# Patient Record
Sex: MALE | Race: WHITE | Employment: OTHER | URBAN - METROPOLITAN AREA
[De-identification: names, ages, dates, MRNs, and addresses within clinical notes are randomized per-mention and may not be internally consistent; named-entity substitution may affect disease eponyms.]

---

## 2017-10-31 ENCOUNTER — OFFICE VISIT (OUTPATIENT)
Dept: ENT CLINIC | Age: 67
End: 2017-10-31

## 2017-10-31 VITALS
HEIGHT: 70 IN | WEIGHT: 167 LBS | SYSTOLIC BLOOD PRESSURE: 116 MMHG | BODY MASS INDEX: 23.91 KG/M2 | DIASTOLIC BLOOD PRESSURE: 78 MMHG

## 2017-10-31 DIAGNOSIS — L98.9 LESION OF SKIN OF FACE: ICD-10-CM

## 2017-10-31 DIAGNOSIS — D48.5 NEOPLASM OF UNCERTAIN BEHAVIOR OF SKIN OF FACE: Primary | ICD-10-CM

## 2017-10-31 PROCEDURE — 11643 EXC F/E/E/N/L MAL+MRG 2.1-3: CPT | Performed by: OTOLARYNGOLOGY

## 2017-10-31 NOTE — PROGRESS NOTES
OPERATIVE NOTE    PREOPERATIVE DIAGNOSIS:  2.0 cm lesion right face     POSTOPERATIVE DIAGNOSIS:  Same      PROCEDURE:  Excise lesion of the right face 2.0 cm uncertain pathology    SURGEON:  Yamila Brewster    ANAESTHESIA:  Lidocaine 1% with epinephrine 1:100,000. FINDINGS:  2 cm lesion of right face cleaned with Betadine and injected with local anesthetic. Lesion excised in an elliptical fashion using a #15 blade scissors and forceps. Size of the lesion with margins was 2.1 cm. Skin bleeders cauterized with handheld electrocautery. Skin edges undermined. Closure with 5 sutures of 5-0 nylon. Dry sterile dressing.     FOLLOW UP:  One week for suture removal.

## 2017-11-07 ENCOUNTER — OFFICE VISIT (OUTPATIENT)
Dept: ENT CLINIC | Age: 67
End: 2017-11-07

## 2017-11-07 VITALS
WEIGHT: 167.4 LBS | HEART RATE: 80 BPM | HEIGHT: 70 IN | BODY MASS INDEX: 23.96 KG/M2 | SYSTOLIC BLOOD PRESSURE: 118 MMHG | DIASTOLIC BLOOD PRESSURE: 74 MMHG

## 2017-11-07 DIAGNOSIS — Z09 STATUS POST EXCISION OF SKIN LESION, FOLLOW-UP EXAM: Primary | ICD-10-CM

## 2017-11-07 PROCEDURE — 99024 POSTOP FOLLOW-UP VISIT: CPT | Performed by: OTOLARYNGOLOGY

## 2017-11-07 NOTE — PROGRESS NOTES
Lesion of right neck excised 1 week ago. Pathology: Basal cell carcinoma. Lateral margins are positive deep margin is not. All sutures removed incision is well-healed. Patient and I are both comfortable just watching the incision to see if there is any further tumor activity. Follow-up: As needed.

## 2018-10-03 ENCOUNTER — OFFICE VISIT (OUTPATIENT)
Dept: ENT CLINIC | Age: 68
End: 2018-10-03
Payer: COMMERCIAL

## 2018-10-03 VITALS
WEIGHT: 174.2 LBS | SYSTOLIC BLOOD PRESSURE: 129 MMHG | HEIGHT: 70 IN | BODY MASS INDEX: 24.94 KG/M2 | DIASTOLIC BLOOD PRESSURE: 76 MMHG | TEMPERATURE: 97.5 F | HEART RATE: 74 BPM

## 2018-10-03 DIAGNOSIS — C44.310 BASAL CELL CARCINOMA (BCC) OF SKIN OF FACE, UNSPECIFIED PART OF FACE: Primary | ICD-10-CM

## 2018-10-03 PROCEDURE — 99212 OFFICE O/P EST SF 10 MIN: CPT | Performed by: OTOLARYNGOLOGY

## 2018-10-03 PROCEDURE — G8484 FLU IMMUNIZE NO ADMIN: HCPCS | Performed by: OTOLARYNGOLOGY

## 2018-10-03 PROCEDURE — 1101F PT FALLS ASSESS-DOCD LE1/YR: CPT | Performed by: OTOLARYNGOLOGY

## 2018-10-03 PROCEDURE — G8427 DOCREV CUR MEDS BY ELIG CLIN: HCPCS | Performed by: OTOLARYNGOLOGY

## 2018-10-03 PROCEDURE — 1123F ACP DISCUSS/DSCN MKR DOCD: CPT | Performed by: OTOLARYNGOLOGY

## 2018-10-03 PROCEDURE — 3017F COLORECTAL CA SCREEN DOC REV: CPT | Performed by: OTOLARYNGOLOGY

## 2018-10-03 PROCEDURE — 4040F PNEUMOC VAC/ADMIN/RCVD: CPT | Performed by: OTOLARYNGOLOGY

## 2018-10-03 PROCEDURE — G8419 CALC BMI OUT NRM PARAM NOF/U: HCPCS | Performed by: OTOLARYNGOLOGY

## 2018-10-03 PROCEDURE — 1036F TOBACCO NON-USER: CPT | Performed by: OTOLARYNGOLOGY

## 2018-10-05 ENCOUNTER — PROCEDURE VISIT (OUTPATIENT)
Dept: ENT CLINIC | Age: 68
End: 2018-10-05
Payer: COMMERCIAL

## 2018-10-05 VITALS
HEART RATE: 84 BPM | WEIGHT: 172.8 LBS | TEMPERATURE: 97.6 F | DIASTOLIC BLOOD PRESSURE: 75 MMHG | SYSTOLIC BLOOD PRESSURE: 136 MMHG | HEIGHT: 70 IN | BODY MASS INDEX: 24.74 KG/M2

## 2018-10-05 DIAGNOSIS — C44.300 MALIGNANT NEOPLASM SKIN OF FACE: Primary | ICD-10-CM

## 2018-10-05 PROCEDURE — 11643 EXC F/E/E/N/L MAL+MRG 2.1-3: CPT | Performed by: OTOLARYNGOLOGY

## 2018-10-12 ENCOUNTER — OFFICE VISIT (OUTPATIENT)
Dept: ENT CLINIC | Age: 68
End: 2018-10-12

## 2018-10-12 DIAGNOSIS — Z09 STATUS POST EXCISION OF SKIN LESION, FOLLOW-UP EXAM: ICD-10-CM

## 2018-10-12 DIAGNOSIS — C44.310 BASAL CELL EPITHELIOMA, FACE: Primary | ICD-10-CM

## 2018-10-12 PROCEDURE — 99024 POSTOP FOLLOW-UP VISIT: CPT | Performed by: OTOLARYNGOLOGY

## 2018-10-19 ENCOUNTER — OFFICE VISIT (OUTPATIENT)
Dept: ENT CLINIC | Age: 68
End: 2018-10-19

## 2018-10-19 DIAGNOSIS — Z09 STATUS POST EXCISION OF SKIN LESION, FOLLOW-UP EXAM: Primary | ICD-10-CM

## 2018-10-19 PROCEDURE — 99024 POSTOP FOLLOW-UP VISIT: CPT | Performed by: OTOLARYNGOLOGY

## 2021-09-24 ENCOUNTER — TELEPHONE (OUTPATIENT)
Dept: CARDIOLOGY CLINIC | Age: 71
End: 2021-09-24

## 2021-09-24 NOTE — TELEPHONE ENCOUNTER
Patent called stating he was referred to Dr. Rigo Stockton by [facility] for abnormal EKG. Patient was informed that Dr. Rigo Stockton was not accepting new patients at this time and was offered an appointment with another provider but declined.

## 2021-10-01 ENCOUNTER — OFFICE VISIT (OUTPATIENT)
Dept: CARDIOLOGY CLINIC | Age: 71
End: 2021-10-01
Payer: MEDICARE

## 2021-10-01 VITALS
HEIGHT: 70 IN | DIASTOLIC BLOOD PRESSURE: 74 MMHG | SYSTOLIC BLOOD PRESSURE: 132 MMHG | WEIGHT: 155.4 LBS | BODY MASS INDEX: 22.25 KG/M2 | HEART RATE: 89 BPM

## 2021-10-01 DIAGNOSIS — I44.7 LBBB (LEFT BUNDLE BRANCH BLOCK): Primary | ICD-10-CM

## 2021-10-01 DIAGNOSIS — C85.91 LYMPHOMA OF LYMPH NODES OF HEAD, UNSPECIFIED LYMPHOMA TYPE (HCC): ICD-10-CM

## 2021-10-01 DIAGNOSIS — E78.00 HYPERCHOLESTEROLEMIA: ICD-10-CM

## 2021-10-01 DIAGNOSIS — R94.31 ABNORMAL EKG: ICD-10-CM

## 2021-10-01 PROCEDURE — 1123F ACP DISCUSS/DSCN MKR DOCD: CPT | Performed by: INTERNAL MEDICINE

## 2021-10-01 PROCEDURE — 93000 ELECTROCARDIOGRAM COMPLETE: CPT | Performed by: INTERNAL MEDICINE

## 2021-10-01 PROCEDURE — G8420 CALC BMI NORM PARAMETERS: HCPCS | Performed by: INTERNAL MEDICINE

## 2021-10-01 PROCEDURE — G8427 DOCREV CUR MEDS BY ELIG CLIN: HCPCS | Performed by: INTERNAL MEDICINE

## 2021-10-01 PROCEDURE — 4040F PNEUMOC VAC/ADMIN/RCVD: CPT | Performed by: INTERNAL MEDICINE

## 2021-10-01 PROCEDURE — 3017F COLORECTAL CA SCREEN DOC REV: CPT | Performed by: INTERNAL MEDICINE

## 2021-10-01 PROCEDURE — 99204 OFFICE O/P NEW MOD 45 MIN: CPT | Performed by: INTERNAL MEDICINE

## 2021-10-01 PROCEDURE — 4004F PT TOBACCO SCREEN RCVD TLK: CPT | Performed by: INTERNAL MEDICINE

## 2021-10-01 PROCEDURE — G8484 FLU IMMUNIZE NO ADMIN: HCPCS | Performed by: INTERNAL MEDICINE

## 2021-10-01 RX ORDER — VALACYCLOVIR HYDROCHLORIDE 1 G/1
1000 TABLET, FILM COATED ORAL DAILY
COMMUNITY
Start: 2020-12-16

## 2021-10-01 RX ORDER — ROSUVASTATIN CALCIUM 20 MG/1
TABLET, COATED ORAL
COMMUNITY
Start: 2021-09-23

## 2021-10-01 RX ORDER — FLUOROMETHOLONE 0.1 %
SUSPENSION, DROPS(FINAL DOSAGE FORM)(ML) OPHTHALMIC (EYE)
COMMUNITY
Start: 2021-09-01

## 2021-10-01 NOTE — PROGRESS NOTES
73 y.o. here for LBBB. Had an ecg bec he was going to have hernia surgery. Had surgery (did fine), and is now here for f/u. No prior cardiac problems. No cp. No sob. No n/v/LH/dizziness. No syncope. Does exercise. Walks, goes 5 miles several days per week. No issues. No LE edema. No orthopnea or PND. No prior MI/stroke. Retired in May of 2020. Normally BP in 130's. Past Medical History:   Diagnosis Date    Tinnitus    LBBB    Past Surgical History:   Procedure Laterality Date    APPENDECTOMY       Social History     Tobacco Use    Smoking status: Never Smoker    Smokeless tobacco: Never Used   Substance Use Topics    Alcohol use: Yes    Drug use: Not on file     No Known Allergies    Family History   Problem Relation Age of Onset    Cancer Mother     Cancer Father      Review of Systems   General: No fevers, chills, fatigue, or night sweats. No abnormal changes in weight. HEENT: No blurry or decreased vision. No changes in hearing, nasal discharge or sore throat. Cardiovascular: See HPI. No cramping in legs or buttocks when walking. Respiratory: No cough, hemoptysis, or wheezing. No history of asthma. Gastrointestinal: No abdominal pain, hematochezia, melana, or history of GI ulcers. Genito-Urinary: No dysuria or hematuria. No urgency or polyuria. Musculoskeletal: No complaints of joint pain, joint swelling or muscular weakness/soreness. Neurological: No dizziness or headaches. No numbness/tingling, speech problems or weakness. No history of a stroke or TIA. Psychological: No anxiety or depression  Hematological and Lymphatic: No abnormal bleeding or bruising, blood clots, jaundice. Endocrine: No malaise/lethargy, palpitations, polydipsia/polyuria, temperature intolerance or unexpected weight changes. Skin: No rashes or non-healing ulcers. PE:  Blood pressure 132/74, pulse 89, height 5' 10\" (1.778 m), weight 155 lb 6.4 oz (70.5 kg). General (appearance):   Well devel. 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 No JVD  Ears/Nose/Mouth/Thorat: No cyanosis  CV: RRR. No m/r/g   Respiratory:  Clear b, normal respiratory effort  GI: abd s/nt/nd  Skin: Warm, dry. No rashes  Neuro/Psych: Alert and oriented x 3. Appropriate behavior  Ext:  No c/c. No edema  Pulses:  2+ radial    No results found for: WBC, HGB, HCT, MCV, PLT  Lab Results   Component Value Date     09/21/2021    K 5.1 09/21/2021     09/21/2021    CO2 26 09/21/2021    BUN 14 09/21/2021    CREATININE 0.8 09/21/2021    GLUCOSE 100 (H) 09/21/2021    CALCIUM 9.7 09/21/2021    LABALBU 4.7 09/21/2021    LABGLOM >60 09/21/2021    GFRAA >60 09/21/2021     Lab Results   Component Value Date    CHOL 258 (H) 09/21/2021    CHOL 181 08/11/2020     Lab Results   Component Value Date    TRIG 66 09/21/2021    TRIG 78 08/11/2020     Lab Results   Component Value Date    HDL 97 (H) 09/21/2021    HDL 66 (H) 08/11/2020     Lab Results   Component Value Date    LDLCALC 148 (H) 09/21/2021    LDLCALC 99 08/11/2020     Lab Results   Component Value Date    LABVLDL 13 09/21/2021    LABVLDL 16 08/11/2020     No results found for: CHOLHDLRATIO    10/2021 ECG: NSR, LBBB    The 10-year ASCVD risk score ([address] et al., 2013) is: 15%    Values used to calculate the score:      Age: 73 years      Sex: Male      Is Non- : No      Diabetic: No      Tobacco smoker: No      Systolic Blood Pressure: 321 mmHg      Is BP treated: No      HDL Cholesterol: 97 mg/dL      Total Cholesterol: 258 mg/dL      Current Outpatient Medications:     rosuvastatin (CRESTOR) 20 MG tablet, , Disp: , Rfl:     fluorometholone (FML) 0.1 % ophthalmic suspension, , Disp: , Rfl:     valACYclovir (VALTREX) 1 g tablet, Take 1,000 mg by mouth daily, Disp: , Rfl:     A/P:  73 y.o. here for LBBB. Issues:  1. LBBB (left bundle branch block)    2. Abnormal EKG    3. Hypercholesterolemia    4.  Lymphoma of lymph nodes of head, unspecified lymphoma type ([facility] [address]      Recs:  -Cont crestor; just started it.  -Get echo  -He has no symptoms, active. If echo ok, will hold off on any further testing for now.

## 2021-10-05 ENCOUNTER — PROCEDURE VISIT (OUTPATIENT)
Dept: CARDIOLOGY CLINIC | Age: 71
End: 2021-10-05
Payer: MEDICARE

## 2021-10-05 DIAGNOSIS — I44.7 LBBB (LEFT BUNDLE BRANCH BLOCK): ICD-10-CM

## 2021-10-05 LAB
LV EF: 23 %
LVEF MODALITY: NORMAL

## 2021-10-05 PROCEDURE — 93306 TTE W/DOPPLER COMPLETE: CPT | Performed by: INTERNAL MEDICINE

## 2021-10-07 ENCOUNTER — PREP FOR PROCEDURE (OUTPATIENT)
Dept: CARDIOLOGY CLINIC | Age: 71
End: 2021-10-07

## 2021-10-07 DIAGNOSIS — I44.7 LBBB (LEFT BUNDLE BRANCH BLOCK): Primary | ICD-10-CM

## 2021-10-07 RX ORDER — SODIUM CHLORIDE 9 MG/ML
25 INJECTION, SOLUTION INTRAVENOUS PRN
Status: CANCELLED | OUTPATIENT
Start: 2021-10-07

## 2021-10-07 RX ORDER — SODIUM CHLORIDE 0.9 % (FLUSH) 0.9 %
5-40 SYRINGE (ML) INJECTION PRN
Status: CANCELLED | OUTPATIENT
Start: 2021-10-07

## 2021-10-07 RX ORDER — SODIUM CHLORIDE 9 MG/ML
INJECTION, SOLUTION INTRAVENOUS CONTINUOUS
Status: CANCELLED | OUTPATIENT
Start: 2021-10-07

## 2021-10-07 RX ORDER — CLOPIDOGREL BISULFATE 75 MG/1
75 TABLET ORAL DAILY
Qty: 30 TABLET | Refills: 0 | Status: SHIPPED
Start: 2021-10-07 | End: 2021-10-13 | Stop reason: HOSPADM

## 2021-10-07 RX ORDER — ASPIRIN 325 MG
325 TABLET ORAL ONCE
Status: CANCELLED | OUTPATIENT
Start: 2021-10-13

## 2021-10-07 RX ORDER — ASPIRIN 81 MG/1
81 TABLET ORAL DAILY
Qty: 30 TABLET | Refills: 0 | Status: SHIPPED | OUTPATIENT
Start: 2021-10-07

## 2021-10-07 RX ORDER — SODIUM CHLORIDE 0.9 % (FLUSH) 0.9 %
5-40 SYRINGE (ML) INJECTION EVERY 12 HOURS SCHEDULED
Status: CANCELLED | OUTPATIENT
Start: 2021-10-07

## 2021-10-07 NOTE — PROGRESS NOTES
Left Heart Catheterization    A left heart catheterization is a procedure that provides your cardiologist with detailed information regarding how your heart functions. A small catheter (long, fine tube) is inserted into an artery (a vessel that carries blood and oxygen) that leads to your heart. While watching with x-ray equipment, small amounts of dye are injected which enables visualization of the heart arteries and chambers. The pictures that your cardiologist receives from the cardiac catheterization enable him or her to decide on the best treatment for you. Date of the procedure: 10/13/21    Time of arrival: 0730    Cardiologist performing the procedure: _Kyree___________    Instructions for your left heart catheterization:    1. Bring a list of your medications to the hospital.    2.  Please notify us before the procedure if you are allergic to anything; especially x-ray contrast dye, iodine, nickel, or any type of jewelry. This is very important! 3. Do not eat or drink anything at all after midnight (or 8 hours) prior to the procedure. 4.  Take all morning medications EXCEPT any diuretics (water pills) the day of the procedure with a small sip of water. 5.  If you are on Coumadin, Warfarin, or Mumtaz Mary, please notify us so that we can make adjustments to your medication. 6.  If you are taking Xarelto, Eliquis, or Pradaxa, please stop staking these medications two days prior to the procedure (including the day of the procedure). 7.  If you are diabetic, check your blood sugar in the morning. If your blood sugar is 120 or less, do not take insulin. If your blood sugar is more than 120, take half the dose of your normal insulin. Do not take Metformin the night before your procedure or morning of the procedure. 8.  You MUST have someone to drive you home--no driving for 24 hours after your procedure.   If an intervention is performed, you might stay overnight in the hospital.    9.  Discharge instructions will be given to you at the time of your procedure. 10.  For any questions or if you cannot keep this appointment for any reason, please call [phone]. Spoke with pt regarding procedure. Pre-op instructions, time and location of arrival discussed. Pt will get COVID test 5-6 days prior to procedure. Pt verbalized understanding and all questions answered at this time.

## 2021-10-12 NOTE — PRE-PROCEDURE INSTRUCTIONS
Called patient about procedure. Told to be here at 0730 for procedure at 0900. Must be NPO after midnight but can take morning medication with sips of water. Patient stated they are not on blood thinners, only on ASA and plavix. Told to have a responsible adult with them to take them home and stay with them afterwards, if they do not get admitted to hospital. Also, to bring a current list of medications. No other questions or concerns.

## 2021-10-13 ENCOUNTER — HOSPITAL ENCOUNTER (OUTPATIENT)
Dept: CARDIAC CATH/INVASIVE PROCEDURES | Age: 71
Discharge: HOME | End: 2021-10-15
Payer: MEDICARE

## 2021-10-13 VITALS
HEIGHT: 70 IN | BODY MASS INDEX: 21.76 KG/M2 | DIASTOLIC BLOOD PRESSURE: 73 MMHG | OXYGEN SATURATION: 97 % | TEMPERATURE: 97.7 F | WEIGHT: 152 LBS | SYSTOLIC BLOOD PRESSURE: 137 MMHG | HEART RATE: 95 BPM

## 2021-10-13 LAB
A/G RATIO: 1.6 (ref 1.1–2.2)
ALBUMIN SERPL-MCNC: 4.6 G/DL (ref 3.4–5)
ALP BLD-CCNC: 63 U/L (ref 40–129)
ALT SERPL-CCNC: 33 U/L (ref 10–40)
ANION GAP SERPL CALCULATED.3IONS-SCNC: 11 MMOL/L (ref 3–16)
AST SERPL-CCNC: 20 U/L (ref 15–37)
BILIRUB SERPL-MCNC: 0.9 MG/DL (ref 0–1)
BUN BLDV-MCNC: 15 MG/DL (ref 7–20)
CALCIUM SERPL-MCNC: 8.9 MG/DL (ref 8.3–10.6)
CHLORIDE BLD-SCNC: 106 MMOL/L (ref 99–110)
CO2: 27 MMOL/L (ref 21–32)
CREAT SERPL-MCNC: 0.8 MG/DL (ref 0.8–1.3)
EKG ATRIAL RATE: 81 BPM
EKG DIAGNOSIS: NORMAL
EKG P AXIS: 56 DEGREES
EKG P-R INTERVAL: 148 MS
EKG Q-T INTERVAL: 426 MS
EKG QRS DURATION: 140 MS
EKG QTC CALCULATION (BAZETT): 494 MS
EKG R AXIS: 38 DEGREES
EKG T AXIS: 137 DEGREES
EKG VENTRICULAR RATE: 81 BPM
GFR AFRICAN AMERICAN: >60
GFR NON-AFRICAN AMERICAN: >60
GLOBULIN: 2.8 G/DL
GLUCOSE BLD-MCNC: 105 MG/DL (ref 70–99)
HCT VFR BLD CALC: 42.5 % (ref 40.5–52.5)
HEMOGLOBIN: 14.4 G/DL (ref 13.5–17.5)
INR BLD: 0.97 (ref 0.88–1.12)
LEFT VENTRICULAR EJECTION FRACTION MODE: NORMAL
LV EF: 25 %
MCH RBC QN AUTO: 30.4 PG (ref 26–34)
MCHC RBC AUTO-ENTMCNC: 33.8 G/DL (ref 31–36)
MCV RBC AUTO: 89.9 FL (ref 80–100)
PDW BLD-RTO: 13.7 % (ref 12.4–15.4)
PLATELET # BLD: 182 K/UL (ref 135–450)
PMV BLD AUTO: 9.3 FL (ref 5–10.5)
POTASSIUM SERPL-SCNC: 4.8 MMOL/L (ref 3.5–5.1)
PROTHROMBIN TIME: 11 SEC (ref 9.9–12.7)
RBC # BLD: 4.72 M/UL (ref 4.2–5.9)
SODIUM BLD-SCNC: 144 MMOL/L (ref 136–145)
TOTAL PROTEIN: 7.4 G/DL (ref 6.4–8.2)
WBC # BLD: 4.9 K/UL (ref 4–11)

## 2021-10-13 PROCEDURE — 85610 PROTHROMBIN TIME: CPT

## 2021-10-13 PROCEDURE — 99152 MOD SED SAME PHYS/QHP 5/>YRS: CPT

## 2021-10-13 PROCEDURE — 2500000003 HC RX 250 WO HCPCS

## 2021-10-13 PROCEDURE — 80053 COMPREHEN METABOLIC PANEL: CPT

## 2021-10-13 PROCEDURE — 6360000002 HC RX W HCPCS

## 2021-10-13 PROCEDURE — C1894 INTRO/SHEATH, NON-LASER: HCPCS

## 2021-10-13 PROCEDURE — 6360000004 HC RX CONTRAST MEDICATION: Performed by: INTERNAL MEDICINE

## 2021-10-13 PROCEDURE — 93458 L HRT ARTERY/VENTRICLE ANGIO: CPT

## 2021-10-13 PROCEDURE — 2709999900 HC NON-CHARGEABLE SUPPLY

## 2021-10-13 PROCEDURE — C1769 GUIDE WIRE: HCPCS

## 2021-10-13 PROCEDURE — 93005 ELECTROCARDIOGRAM TRACING: CPT | Performed by: INTERNAL MEDICINE

## 2021-10-13 PROCEDURE — 85027 COMPLETE CBC AUTOMATED: CPT

## 2021-10-13 PROCEDURE — 93010 ELECTROCARDIOGRAM REPORT: CPT | Performed by: INTERNAL MEDICINE

## 2021-10-13 PROCEDURE — 99152 MOD SED SAME PHYS/QHP 5/>YRS: CPT | Performed by: INTERNAL MEDICINE

## 2021-10-13 PROCEDURE — 93458 L HRT ARTERY/VENTRICLE ANGIO: CPT | Performed by: INTERNAL MEDICINE

## 2021-10-13 RX ORDER — SODIUM CHLORIDE 0.9 % (FLUSH) 0.9 %
5-40 SYRINGE (ML) INJECTION PRN
Status: DISCONTINUED | OUTPATIENT
Start: 2021-10-13 | End: 2021-10-16 | Stop reason: HOSPADM

## 2021-10-13 RX ORDER — SODIUM CHLORIDE 9 MG/ML
25 INJECTION, SOLUTION INTRAVENOUS PRN
Status: DISCONTINUED | OUTPATIENT
Start: 2021-10-13 | End: 2021-10-16 | Stop reason: HOSPADM

## 2021-10-13 RX ORDER — SODIUM CHLORIDE 0.9 % (FLUSH) 0.9 %
5-40 SYRINGE (ML) INJECTION EVERY 12 HOURS SCHEDULED
Status: DISCONTINUED | OUTPATIENT
Start: 2021-10-13 | End: 2021-10-16 | Stop reason: HOSPADM

## 2021-10-13 RX ORDER — SODIUM CHLORIDE 9 MG/ML
INJECTION, SOLUTION INTRAVENOUS CONTINUOUS
Status: ACTIVE | OUTPATIENT
Start: 2021-10-13 | End: 2021-10-13

## 2021-10-13 RX ORDER — SODIUM CHLORIDE 9 MG/ML
INJECTION, SOLUTION INTRAVENOUS CONTINUOUS
Status: DISCONTINUED | OUTPATIENT
Start: 2021-10-13 | End: 2021-10-16 | Stop reason: HOSPADM

## 2021-10-13 RX ORDER — ASPIRIN 325 MG
325 TABLET ORAL ONCE
Status: DISCONTINUED | OUTPATIENT
Start: 2021-10-13 | End: 2021-10-16 | Stop reason: HOSPADM

## 2021-10-13 RX ORDER — ACETAMINOPHEN 325 MG/1
650 TABLET ORAL EVERY 4 HOURS PRN
Status: DISCONTINUED | OUTPATIENT
Start: 2021-10-13 | End: 2021-10-16 | Stop reason: HOSPADM

## 2021-10-13 RX ORDER — LISINOPRIL 5 MG/1
5 TABLET ORAL DAILY
Qty: 90 TABLET | Refills: 3 | Status: SHIPPED | OUTPATIENT
Start: 2021-10-13 | End: 2021-12-01 | Stop reason: SDUPTHER

## 2021-10-13 RX ADMIN — IOHEXOL 150 ML: 350 INJECTION, SOLUTION INTRAVENOUS at 09:52

## 2021-10-13 NOTE — PLAN OF CARE
Brief Pre-Op Note/Sedation Assessment      Anastasio Spurling  1950  [id number]  9:28 AM    Planned Procedure: Cardiac Catheterization Procedure  Post Procedure Plan: Return to same level of care  Consent: I have discussed with the patient and/or the patient representative the indication, alternatives, and the possible risks and/or complications of the planned procedure and the anesthesia methods. The patient and/or patient representative appear to understand and agree to proceed. Chief Complaint:   Dyspnea on Exertion      Indications for Cath Procedure:  1. Presentation:  Suspected CAD and LV Dysfunction  2. Anginal Classification within 2 weeks:  No symptoms  3. Angina Symptoms Assessment:  Asymptomatic  4. Heart Failure Class within last 2 weeks:  No symptoms  5. Cardiovascular Instability:  No    Prior Ischemic Workup/Eval:  1. Pre-Procedural Medications: Yes: Aspirin and STATIN  2. Stress Test Completed? No    Does Patient need surgery?   Cath Valve Surgery:  No    Pre-Procedure Medical History:  Vital Signs:  /73   Pulse 95   Temp 97.7 °F (36.5 °C) (Oral)   Ht 5' 10\" (1.778 m)   Wt 152 lb (68.9 kg)   SpO2 97%   BMI 21.81 kg/m²     Allergies:  No Known Allergies  Medications:    Current Outpatient Medications   Medication Sig Dispense Refill    clopidogrel (PLAVIX) 75 MG tablet Take 1 tablet by mouth daily 30 tablet 0    aspirin EC 81 MG EC tablet Take 1 tablet by mouth daily 30 tablet 0    rosuvastatin (CRESTOR) 20 MG tablet       fluorometholone (FML) 0.1 % ophthalmic suspension       valACYclovir (VALTREX) 1 g tablet Take 1,000 mg by mouth daily       Current Facility-Administered Medications   Medication Dose Route Frequency Provider Last Rate Last Admin    0.9 % sodium chloride infusion   IntraVENous Continuous Maribel Churchill MD        0.9 % sodium chloride infusion  25 mL IntraVENous PRN Maribel Churchill MD        aspirin tablet 325 mg  325 mg Oral Once Shawna Austin Velázquez MD        sodium chloride flush 0.9 % injection 5-40 mL  5-40 mL IntraVENous 2 times per day Tracy Dasilva MD        sodium chloride flush 0.9 % injection 5-40 mL  5-40 mL IntraVENous PRN Tracy Dasilva MD           Past Medical History:    Past Medical History:   Diagnosis Date    Tinnitus        Surgical History:    Past Surgical History:   Procedure Laterality Date    APPENDECTOMY               Pre-Sedation:  Pre-Sedation Documentation and Exam:  I have personally completed a history, physical exam & review of systems for this patient (see notes). Prior History of Anesthesia Complications:   None    Modified Mallampati:  II (soft palate, uvula, fauces visible)    ASA Classification:  Class 3 - A patient with severe systemic disease that limits activity but is not incapacitating    Shanique Scale: Activity:  2 - Able to move 4 extremities voluntarily on command  Respiration:  2 - Able to breathe deeply and cough freely  Circulation:  2 - BP+/- 20mmHg of normal  Consciousness:  2 - Fully awake  Oxygen Saturation (color):  2 - Able to maintain oxygen saturation >92% on room air    Sedation/Anesthesia Plan:  Guard the patient's safety and welfare. Minimize physical discomfort and pain. Minimize negative psychological responses to treatment by providing sedation and analgesia and maximize the potential amnesia. Patient to meet pre-procedure discharge plan.     Medication Planned:  midazolam intravenously and fentanyl intravenously    Patient is an appropriate candidate for plan of sedation:   yes      Electronically signed by Tracy Dasilva MD on 10/13/2021 at 9:28 AM

## 2021-10-13 NOTE — PROCEDURES
Brenda Alexis  73 y.o.  [id number]    Referring MD:  Dr. Shelton Fontan    Indication:  LV dysfunction    Mallampati Class: 2    ASA Class: 3    After informed consent was obtained and history and exam reviewed, the patient was taken to the cardiac cath lab. The patient had both groins and the right wrist prepped and draped in sterile fashion. 1% Xylocaine was used to anesthetize the radial artery. A needle was inserted into the radial artery and a 5/6 Fr sheath was inserted. A solution of 2.5 mg verapamil, 200 mcg nitroglycerin, and 3,000U of heparin was administered via the sheath. Continuous pulse-oximetry and ECG monitoring was performed, and frequent blood pressure assessment was performed. An independent trained observer pushed medications at my direction. We monitored the patient's level of consciousness and vital signs/physiologic status throughout the procedure duration (see start and stop times below). A JR4 catheter was advanced through the sheath over a guide wire and advanced under fluoroscopic guidance into the ascending aorta. The wire was removed and the right coronary artery was engaged. Digital angiograms were recorded. The catheter was then removed and a JL 3.5 catheter was then advanced over the wire into the ascending aorta. The wire was removed, and the left main coronary artery was engaged. Digital angiograms were recorded. The catheter was then removed, and a pigtail catheter was advanced over the wire to the ascending aorta. The pigtail catheter was then placed into the left ventricle. Pressures were recorded. Ventriculography was performed. Post-ventriculography pressures and a pullback were performed. The pigtail catheter was then removed. All catheter exchanges done over a wire.     Hemostasis was obtained by TR Band    Complications: None    Estimated blood loss: < 50 mL    Sedation: 1 mg Versed, 25 mcg Fentanyl  Sedation start: 0939  Sedation stop: 1236    Angiographic Findings:  Right dominant system  Left Main:  Normal    Left Anterior Descending:  Mid 20% stenosis. Circumflex:  Non-dominant. No obstructive disease    Right Coronary:  Very large artery. Normal    Left Ventriculogram:  LVEF 20-25%    Hemodynamics (mm Hg):  Left Ventricular Pressure:  110/2, 4  Central Aortic Pressure:  117/67 (87)    Conclusions:  -No obstructive CAD  -Markedly depressed LVEF    Recommendations:  -BB and ACE  -Aldactone in the future  -Statin    Rocco Saleh MD, [facility] [place], [place]

## 2021-10-13 NOTE — H&P
73 y.o. here for cath. Has lbbb. Had an ecg bec he was going to have hernia surgery. Had surgery (did fine), and is now here for f/u. No prior cardiac problems. No cp. No sob. No n/v/LH/dizziness. No syncope. Does exercise. Walks, goes 5 miles several days per week. No issues. No LE edema. No orthopnea or PND. No prior MI/stroke. Retired in May of 2020. Normally BP in 130's. Past Medical History:   Diagnosis Date    Tinnitus    LBBB    Past Surgical History:   Procedure Laterality Date    APPENDECTOMY       Social History     Tobacco Use    Smoking status: Never Smoker    Smokeless tobacco: Never Used   Substance Use Topics    Alcohol use: Yes    Drug use: Not on file     No Known Allergies    Family History   Problem Relation Age of Onset    Cancer Mother     Cancer Father      Review of Systems   General: No fevers, chills, fatigue, or night sweats. No abnormal changes in weight. HEENT: No blurry or decreased vision. No changes in hearing, nasal discharge or sore throat. Cardiovascular: See HPI. No cramping in legs or buttocks when walking. Respiratory: No cough, hemoptysis, or wheezing. No history of asthma. Gastrointestinal: No abdominal pain, hematochezia, melana, or history of GI ulcers. Genito-Urinary: No dysuria or hematuria. No urgency or polyuria. Musculoskeletal: No complaints of joint pain, joint swelling or muscular weakness/soreness. Neurological: No dizziness or headaches. No numbness/tingling, speech problems or weakness. No history of a stroke or TIA. Psychological: No anxiety or depression  Hematological and Lymphatic: No abnormal bleeding or bruising, blood clots, jaundice. Endocrine: No malaise/lethargy, palpitations, polydipsia/polyuria, temperature intolerance or unexpected weight changes. Skin: No rashes or non-healing ulcers.     PE:  Blood pressure 137/73, pulse 95, temperature 97.7 °F (36.5 °C), temperature source Oral, height 5' 10\" (1.778 m), weight 152 lb (68.9 kg), SpO2 97 %. General (appearance): Well devel. 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 No JVD  Ears/Nose/Mouth/Thorat: No cyanosis  CV: RRR. No m/r/g   Respiratory:  Clear b, normal respiratory effort  GI: abd s/nt/nd  Skin: Warm, dry. No rashes  Neuro/Psych: Alert and oriented x 3. Appropriate behavior  Ext:  No c/c. No edema  Pulses:  2+ radial    Lab Results   Component Value Date    WBC 4.9 10/13/2021    HGB 14.4 10/13/2021    HCT 42.5 10/13/2021    MCV 89.9 10/13/2021     10/13/2021     Lab Results   Component Value Date     10/13/2021    K 4.8 10/13/2021     10/13/2021    CO2 27 10/13/2021    BUN 15 10/13/2021    CREATININE 0.8 10/13/2021    GLUCOSE 105 (H) 10/13/2021    CALCIUM 8.9 10/13/2021    PROT 7.4 10/13/2021    LABALBU 4.6 10/13/2021    BILITOT 0.9 10/13/2021    ALKPHOS 63 10/13/2021    AST 20 10/13/2021    ALT 33 10/13/2021    LABGLOM >60 10/13/2021    GFRAA >60 10/13/2021    AGRATIO 1.6 10/13/2021    GLOB 2.8 10/13/2021     Lab Results   Component Value Date    CHOL 258 (H) 09/21/2021    CHOL 181 08/11/2020     Lab Results   Component Value Date    TRIG 66 09/21/2021    TRIG 78 08/11/2020     Lab Results   Component Value Date    HDL 97 (H) 09/21/2021    HDL 66 (H) 08/11/2020     Lab Results   Component Value Date    LDLCALC 148 (H) 09/21/2021    LDLCALC 99 08/11/2020     Lab Results   Component Value Date    LABVLDL 13 09/21/2021    LABVLDL 16 08/11/2020     No results found for: CHOLHDLRATIO    10/2021 ECG: NSR, LBBB    10/5/2021 TTE: EF 20-25% with blobal dysfunction.     The 10-year ASCVD risk score (Mir Munoz, et al., 2013) is: 15.9%    Values used to calculate the score:      Age: 73 years      Sex: Male      Is Non- : No      Diabetic: No      Tobacco smoker: No      Systolic Blood Pressure: 755 mmHg      Is BP treated: No      HDL Cholesterol: 97 mg/dL      Total Cholesterol: 258 mg/dL      Current Outpatient Medications:     clopidogrel (PLAVIX) 75 MG tablet, Take 1 tablet by mouth daily, Disp: 30 tablet, Rfl: 0    aspirin EC 81 MG EC tablet, Take 1 tablet by mouth daily, Disp: 30 tablet, Rfl: 0    rosuvastatin (CRESTOR) 20 MG tablet, , Disp: , Rfl:     fluorometholone (FML) 0.1 % ophthalmic suspension, , Disp: , Rfl:     valACYclovir (VALTREX) 1 g tablet, Take 1,000 mg by mouth daily, Disp: , Rfl:     A/P:  73 y.o. here for LBBB.   Issues:  -LBBB  -Severe LV dysfunction    Recs:  -Cont crestor  -Aspirin; has been on plavix  -[facility] today

## 2021-10-20 ENCOUNTER — OFFICE VISIT (OUTPATIENT)
Dept: CARDIOLOGY CLINIC | Age: 71
End: 2021-10-20
Payer: MEDICARE

## 2021-10-20 VITALS
WEIGHT: 157.4 LBS | HEART RATE: 80 BPM | SYSTOLIC BLOOD PRESSURE: 110 MMHG | DIASTOLIC BLOOD PRESSURE: 60 MMHG | HEIGHT: 70 IN | BODY MASS INDEX: 22.54 KG/M2

## 2021-10-20 DIAGNOSIS — I50.20 HFREF (HEART FAILURE WITH REDUCED EJECTION FRACTION) (HCC): ICD-10-CM

## 2021-10-20 DIAGNOSIS — E78.00 HYPERCHOLESTEROLEMIA: ICD-10-CM

## 2021-10-20 DIAGNOSIS — I44.7 LBBB (LEFT BUNDLE BRANCH BLOCK): Primary | ICD-10-CM

## 2021-10-20 DIAGNOSIS — Z98.890 S/P CORONARY ANGIOGRAM: ICD-10-CM

## 2021-10-20 LAB
ANION GAP SERPL CALCULATED.3IONS-SCNC: 14 MMOL/L (ref 3–16)
BUN BLDV-MCNC: 20 MG/DL (ref 7–20)
CALCIUM SERPL-MCNC: 9.6 MG/DL (ref 8.3–10.6)
CHLORIDE BLD-SCNC: 101 MMOL/L (ref 99–110)
CO2: 23 MMOL/L (ref 21–32)
CREAT SERPL-MCNC: 0.9 MG/DL (ref 0.8–1.3)
GFR AFRICAN AMERICAN: >60
GFR NON-AFRICAN AMERICAN: >60
GLUCOSE BLD-MCNC: 92 MG/DL (ref 70–99)
POTASSIUM SERPL-SCNC: 4.7 MMOL/L (ref 3.5–5.1)
SODIUM BLD-SCNC: 138 MMOL/L (ref 136–145)

## 2021-10-20 PROCEDURE — 1036F TOBACCO NON-USER: CPT | Performed by: NURSE PRACTITIONER

## 2021-10-20 PROCEDURE — 3017F COLORECTAL CA SCREEN DOC REV: CPT | Performed by: NURSE PRACTITIONER

## 2021-10-20 PROCEDURE — 1123F ACP DISCUSS/DSCN MKR DOCD: CPT | Performed by: NURSE PRACTITIONER

## 2021-10-20 PROCEDURE — G8427 DOCREV CUR MEDS BY ELIG CLIN: HCPCS | Performed by: NURSE PRACTITIONER

## 2021-10-20 PROCEDURE — G8484 FLU IMMUNIZE NO ADMIN: HCPCS | Performed by: NURSE PRACTITIONER

## 2021-10-20 PROCEDURE — 4040F PNEUMOC VAC/ADMIN/RCVD: CPT | Performed by: NURSE PRACTITIONER

## 2021-10-20 PROCEDURE — 99214 OFFICE O/P EST MOD 30 MIN: CPT | Performed by: NURSE PRACTITIONER

## 2021-10-20 PROCEDURE — G8420 CALC BMI NORM PARAMETERS: HCPCS | Performed by: NURSE PRACTITIONER

## 2021-10-20 RX ORDER — METOPROLOL SUCCINATE 25 MG/1
25 TABLET, EXTENDED RELEASE ORAL DAILY
Qty: 90 TABLET | Refills: 3 | Status: SHIPPED | OUTPATIENT
Start: 2021-10-20 | End: 2021-11-10 | Stop reason: SDUPTHER

## 2021-10-20 NOTE — PROGRESS NOTES
CC CHF, s/p LHC     HPI:  73 y.o. patient of Dr Karla Figueroa with LBBB, LV dysfunction and HLD who had LHC which demonstrated no obstructive CAD and EF 25%. He denies cp, sob, LH/dizziness, palpitations or syncope. No LE edema, orthopnea of PND. No fever, chills, n/v/d or GI/ bleeding. Walks 5 miles a day. -130 before lisinopril and now  110s. Past Medical History:   Diagnosis Date    LBBB (left bundle branch block) 10/2021    LV dysfunction 10/2021    EF 25%. No CAD seen on LHC    Mixed hyperlipidemia     Tinnitus      Past Surgical History:   Procedure Laterality Date    APPENDECTOMY       Family History   Problem Relation Age of Onset    Cancer Mother     Cancer Father      Social History     Tobacco Use    Smoking status: Never Smoker    Smokeless tobacco: Never Used   Substance Use Topics    Alcohol use: Yes    Drug use: Not on file     Allergies:Patient has no known allergies. Review of Systems  General: No changes in weight, fatigue, or night sweats. HEENT: No blurry or decreased vision. No changes in hearing, nasal discharge or sore throat. Cardiovascular:  See HPI. Respiratory: No cough, hemoptysis, or wheezing. Gastrointestinal:  No abdominal pain, hematochezia, melana, constipation, diarrhea, or history of GI ulcers. Genito-Urinary: No dysuria or hematuria. No urgency or polyuria. Musculoskeletal:  No complaints of joint pain, joint swelling or muscular weakness/soreness. Neurological:  No dizziness, headaches, numbness/tingling, speech problems or weakness. Psychological:  No anxiety or depression. Hematological and Lymphatic: No abnormal bleeding or bruising, blood clots, jaundice or swollen lymph nodes. Endocrine:   No malaise/lethargy, palpitations, polydipsia/polyuria, temperature intolerance or unexpected weight changes  Skin:  No rashes or non-healing ulcers.     Physical Exam:  /60   Pulse 80   Ht 5' 10\" (1.778 m)   Wt 157 lb 6.4 oz (71.4 kg)   BMI 22.58 kg/m²    General (appearance):  No acute distress  Eyes: anicteric   Neck: soft, No JVD  Ears/Nose/Mouth/Thorat: No cyanosis  CV: RRR   Respiratory:  Clear, normal effort  GI: soft, non-tender, non-distended  Skin: Warm, dry. No rashes  Neuro/Psych: Alert and oriented x 3. Appropriate behavior  Ext:  No c/c. No edema  Pulses:  2+ right femoral. Right groin soft, no hematoma, good cap refill. Weight  Wt Readings from Last 3 Encounters:   10/13/21 152 lb (68.9 kg)   10/01/21 155 lb 6.4 oz (70.5 kg)   10/05/18 172 lb 12.8 oz (78.4 kg)          CBC:   Lab Results   Component Value Date    WBC 4.9 10/13/2021    HGB 14.4 10/13/2021    HCT 42.5 10/13/2021    MCV 89.9 10/13/2021     10/13/2021     BMP:  Lab Results   Component Value Date    CREATININE 0.8 10/13/2021    BUN 15 10/13/2021     10/13/2021    K 4.8 10/13/2021     10/13/2021    CO2 27 10/13/2021     Mag: No results found for: MG  LIVER PROFILE:   Lab Results   Component Value Date    ALT 33 10/13/2021    AST 20 10/13/2021    ALKPHOS 63 10/13/2021    BILITOT 0.9 10/13/2021     PT/INR:   Lab Results   Component Value Date    INR 0.97 10/13/2021    PROTIME 11.0 10/13/2021     Pro-BNP No results found for: PROBNP  LIPIDS:  No components found for: CHLPL  Lab Results   Component Value Date    TRIG 66 09/21/2021    TRIG 78 08/11/2020     Lab Results   Component Value Date    HDL 97 (H) 09/21/2021    HDL 66 (H) 08/11/2020     Lab Results   Component Value Date    LDLCALC 148 (H) 09/21/2021    LDLCALC 99 08/11/2020     Lab Results   Component Value Date    LABVLDL 13 09/21/2021    LABVLDL 16 08/11/2020     TSH:No results found for: TSH, Q1OIXIA, A2BBMIR, THYROIDAB    IMAGING:     10/13/2021 Coronary angiogram  Angiographic Findings:  Right dominant system  Left Main:  Normal  Left Anterior Descending:  Mid 20% stenosis. Circumflex:  Non-dominant. No obstructive disease  Right Coronary:  Very large artery.  Normal  Left Ventriculogram:  LVEF 20-25%  Hemodynamics (mm Hg):  Left Ventricular Pressure:  110/2, 4  Central Aortic Pressure:  117/67 (87)    10/5/2021 Echo:   Left ventricular cavity size is normal. There is mild concentric left   ventricular hypertrophy. Left ventricular function is reduced with ejection   fraction estimated at 20-25%. Global left ventricular hypokinesis is   present. Diastolic filling parameters suggest grade I diastolic dysfunction. Mitral annular calcification is present. Mild mitral regurgitation is   present. Mild aortic regurgitation is present. Mild tricuspid regurgitation. IVC size is dilated (>2.1 cm) but collapses > 50% with respiration   consistent with elevated RA pressure (8 mmHg). Estimated pulmonary artery   systolic pressure is at 31 mmHg assuming a right atrial pressure of 8 mmHg. Medications:   Current Outpatient Medications   Medication Sig Dispense Refill    lisinopril (PRINIVIL;ZESTRIL) 5 MG tablet Take 1 tablet by mouth daily 90 tablet 3    aspirin EC 81 MG EC tablet Take 1 tablet by mouth daily 30 tablet 0    rosuvastatin (CRESTOR) 20 MG tablet       fluorometholone (FML) 0.1 % ophthalmic suspension       valACYclovir (VALTREX) 1 g tablet Take 1,000 mg by mouth daily       No current facility-administered medications for this visit. Assessment:  1. LBBB (left bundle branch block)    2. Hypercholesterolemia    3. HFrEF (heart failure with reduced ejection fraction) ([address]    4. S/P coronary angiogram        Plan:  LBBB s/p Coronary angiogram; stable   No obstructive CAD on [facility]   ASA, crestor   ACE, BB  HFrEF EF 25%; acute issue   Appears compenstated   Lisinopril 5 mg started 10/13.  Check BMP   Start Toprol XL 25 mg po daily   Will add on MRA and SGLT2 if BP and kidneys can tolerates   Discussed s/sp decompensated HF  HLD; uncontrolled       Crestor started    Will need to recheck CMP/Lipids    Follow up with Dr Michelle Haro in 3 weeks     Reviewed most recent: CBC, BMP, LFT, Lipids, PT/INR, TSH  Reviewed most recent: ECG, Echo,  C

## 2021-10-21 ENCOUNTER — TELEPHONE (OUTPATIENT)
Dept: CARDIOLOGY CLINIC | Age: 71
End: 2021-10-21

## 2021-10-21 NOTE — TELEPHONE ENCOUNTER
Patient said his insurance denied his metoprolol that was prescribed by [facility] yesterday. I called Cristiano to see if it needed a PA. Fidelia Mar at pharmacy said it was denied it just cost $20 after insurance.        Called pt back and cleared it up

## 2021-11-10 ENCOUNTER — OFFICE VISIT (OUTPATIENT)
Dept: CARDIOLOGY CLINIC | Age: 71
End: 2021-11-10
Payer: MEDICARE

## 2021-11-10 VITALS
HEIGHT: 70 IN | SYSTOLIC BLOOD PRESSURE: 130 MMHG | OXYGEN SATURATION: 97 % | DIASTOLIC BLOOD PRESSURE: 70 MMHG | HEART RATE: 80 BPM | BODY MASS INDEX: 22.96 KG/M2 | WEIGHT: 160.4 LBS

## 2021-11-10 DIAGNOSIS — I44.7 LBBB (LEFT BUNDLE BRANCH BLOCK): ICD-10-CM

## 2021-11-10 DIAGNOSIS — I50.20 HFREF (HEART FAILURE WITH REDUCED EJECTION FRACTION) (HCC): Primary | ICD-10-CM

## 2021-11-10 DIAGNOSIS — E78.00 HYPERCHOLESTEROLEMIA: ICD-10-CM

## 2021-11-10 DIAGNOSIS — R94.31 ABNORMAL EKG: ICD-10-CM

## 2021-11-10 PROCEDURE — G8427 DOCREV CUR MEDS BY ELIG CLIN: HCPCS | Performed by: INTERNAL MEDICINE

## 2021-11-10 PROCEDURE — 1123F ACP DISCUSS/DSCN MKR DOCD: CPT | Performed by: INTERNAL MEDICINE

## 2021-11-10 PROCEDURE — 3017F COLORECTAL CA SCREEN DOC REV: CPT | Performed by: INTERNAL MEDICINE

## 2021-11-10 PROCEDURE — 1036F TOBACCO NON-USER: CPT | Performed by: INTERNAL MEDICINE

## 2021-11-10 PROCEDURE — 4040F PNEUMOC VAC/ADMIN/RCVD: CPT | Performed by: INTERNAL MEDICINE

## 2021-11-10 PROCEDURE — 99214 OFFICE O/P EST MOD 30 MIN: CPT | Performed by: INTERNAL MEDICINE

## 2021-11-10 PROCEDURE — G8484 FLU IMMUNIZE NO ADMIN: HCPCS | Performed by: INTERNAL MEDICINE

## 2021-11-10 PROCEDURE — G8420 CALC BMI NORM PARAMETERS: HCPCS | Performed by: INTERNAL MEDICINE

## 2021-11-10 RX ORDER — METOPROLOL SUCCINATE 50 MG/1
50 TABLET, EXTENDED RELEASE ORAL DAILY
Qty: 90 TABLET | Refills: 3 | Status: SHIPPED | OUTPATIENT
Start: 2021-11-10 | End: 2021-12-21 | Stop reason: SDUPTHER

## 2021-11-10 NOTE — PROGRESS NOTES
aliya. 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 No JVD  Ears/Nose/Mouth/Thorat: No cyanosis  CV: RRR. No m/r/g   Respiratory:  Clear b, normal respiratory effort  GI: abd s/nt/nd  Skin: Warm, dry. No rashes  Neuro/Psych: Alert and oriented x 3. Appropriate behavior  Ext:  No c/c. No edema  Pulses:  2+ radial    Lab Results   Component Value Date    WBC 4.9 10/13/2021    HGB 14.4 10/13/2021    HCT 42.5 10/13/2021    MCV 89.9 10/13/2021     10/13/2021     Lab Results   Component Value Date     10/20/2021    K 4.7 10/20/2021     10/20/2021    CO2 23 10/20/2021    BUN 20 10/20/2021    CREATININE 0.9 10/20/2021    GLUCOSE 92 10/20/2021    CALCIUM 9.6 10/20/2021    PROT 7.4 10/13/2021    LABALBU 4.6 10/13/2021    BILITOT 0.9 10/13/2021    ALKPHOS 63 10/13/2021    AST 20 10/13/2021    ALT 33 10/13/2021    LABGLOM >60 10/20/2021    GFRAA >60 10/20/2021    AGRATIO 1.6 10/13/2021    GLOB 2.8 10/13/2021     Lab Results   Component Value Date    CHOL 258 (H) 09/21/2021    CHOL 181 08/11/2020     Lab Results   Component Value Date    TRIG 66 09/21/2021    TRIG 78 08/11/2020     Lab Results   Component Value Date    HDL 97 (H) 09/21/2021    HDL 66 (H) 08/11/2020     Lab Results   Component Value Date    LDLCALC 148 (H) 09/21/2021    LDLCALC 99 08/11/2020     Lab Results   Component Value Date    LABVLDL 13 09/21/2021    LABVLDL 16 08/11/2020     No results found for: CHOLHDLRATIO    10/2021 LHC:  Angiographic Findings:  Right dominant system  Left Main:  Normal  Left Anterior Descending:  Mid 20% stenosis. Circumflex:  Non-dominant. No obstructive disease  Right Coronary:  Very large artery.  Normal  Left Ventriculogram:  LVEF 20-25%  Hemodynamics (mm Hg):  Left Ventricular Pressure:  110/2, 4  Central Aortic Pressure:  117/67 (87)    10/2021 ECG: NSR, LBBB    The 10-year ASCVD risk score (Mir Munoz, et al., 2013) is: 14.6%    Values used to calculate the score: Age: 73 years      Sex: Male      Is Non- : No      Diabetic: No      Tobacco smoker: No      Systolic Blood Pressure: 604 mmHg      Is BP treated: No      HDL Cholesterol: 97 mg/dL      Total Cholesterol: 258 mg/dL      Current Outpatient Medications:     metoprolol succinate (TOPROL XL) 25 MG extended release tablet, Take 1 tablet by mouth daily, Disp: 90 tablet, Rfl: 3    lisinopril (PRINIVIL;ZESTRIL) 5 MG tablet, Take 1 tablet by mouth daily, Disp: 90 tablet, Rfl: 3    aspirin EC 81 MG EC tablet, Take 1 tablet by mouth daily, Disp: 30 tablet, Rfl: 0    rosuvastatin (CRESTOR) 20 MG tablet, , Disp: , Rfl:     fluorometholone (FML) 0.1 % ophthalmic suspension, , Disp: , Rfl:     valACYclovir (VALTREX) 1 g tablet, Take 1,000 mg by mouth daily, Disp: , Rfl:     A/P:  73 y.o. here for LBBB. Issues:  1. HFrEF (heart failure with reduced ejection fraction) ([facility]    2. LBBB (left bundle branch block)    3. Abnormal EKG    4. Hypercholesterolemia      Recs:  -Cont crestor  -Increase toprol to 50  -Cont lisinopril 5  -See Corina in 2 weeks. Increase lisinopril to 10 mg daily then.  See Moraima Blanco 2 weeks after that and go up on toprol if HR is over 70 or lisinopril if HR < 70.  -See me in 3 mo

## 2021-12-01 ENCOUNTER — OFFICE VISIT (OUTPATIENT)
Dept: CARDIOLOGY CLINIC | Age: 71
End: 2021-12-01
Payer: MEDICARE

## 2021-12-01 ENCOUNTER — TELEPHONE (OUTPATIENT)
Dept: CARDIOLOGY CLINIC | Age: 71
End: 2021-12-01

## 2021-12-01 VITALS
HEIGHT: 70 IN | WEIGHT: 163 LBS | BODY MASS INDEX: 23.34 KG/M2 | SYSTOLIC BLOOD PRESSURE: 119 MMHG | DIASTOLIC BLOOD PRESSURE: 70 MMHG | HEART RATE: 65 BPM

## 2021-12-01 DIAGNOSIS — I44.7 LBBB (LEFT BUNDLE BRANCH BLOCK): ICD-10-CM

## 2021-12-01 DIAGNOSIS — E78.00 HYPERCHOLESTEROLEMIA: ICD-10-CM

## 2021-12-01 DIAGNOSIS — I50.20 HFREF (HEART FAILURE WITH REDUCED EJECTION FRACTION) (HCC): Primary | ICD-10-CM

## 2021-12-01 PROCEDURE — 99214 OFFICE O/P EST MOD 30 MIN: CPT | Performed by: NURSE PRACTITIONER

## 2021-12-01 PROCEDURE — 3017F COLORECTAL CA SCREEN DOC REV: CPT | Performed by: NURSE PRACTITIONER

## 2021-12-01 PROCEDURE — G8484 FLU IMMUNIZE NO ADMIN: HCPCS | Performed by: NURSE PRACTITIONER

## 2021-12-01 PROCEDURE — 1036F TOBACCO NON-USER: CPT | Performed by: NURSE PRACTITIONER

## 2021-12-01 PROCEDURE — 1123F ACP DISCUSS/DSCN MKR DOCD: CPT | Performed by: NURSE PRACTITIONER

## 2021-12-01 PROCEDURE — 4040F PNEUMOC VAC/ADMIN/RCVD: CPT | Performed by: NURSE PRACTITIONER

## 2021-12-01 PROCEDURE — G8420 CALC BMI NORM PARAMETERS: HCPCS | Performed by: NURSE PRACTITIONER

## 2021-12-01 PROCEDURE — G8427 DOCREV CUR MEDS BY ELIG CLIN: HCPCS | Performed by: NURSE PRACTITIONER

## 2021-12-01 RX ORDER — LISINOPRIL 10 MG/1
10 TABLET ORAL DAILY
Qty: 90 TABLET | Refills: 3 | Status: SHIPPED | OUTPATIENT
Start: 2021-12-01 | End: 2021-12-21 | Stop reason: SDUPTHER

## 2021-12-01 NOTE — PROGRESS NOTES
CC CHF, s/p LHC     HPI:  73 y.o. patient of Dr Genesis Johnson with LBBB, LV dysfunction and HLD who had LHC which demonstrated no obstructive CAD and EF 25%. We are triturating CHF medications. He is doing well. Denies cp, sob, LH/dizziness, palpitations or syncope. No LE edema, orthopnea, PND, abdominal bloating or weight gain. No fever, chills, n/v/d or GI/ bleeding. Continues to walk miles a day. Past Medical History:   Diagnosis Date    LBBB (left bundle branch block) 10/2021    LV dysfunction 10/2021    EF 25%. No CAD seen on [facility]    Mixed hyperlipidemia     Tinnitus      Past Surgical History:   Procedure Laterality Date    APPENDECTOMY       Family History   Problem Relation Age of Onset    Cancer Mother     Cancer Father      Social History     Tobacco Use    Smoking status: Never Smoker    Smokeless tobacco: Never Used   Substance Use Topics    Alcohol use: Yes    Drug use: Not on file     Allergies:Patient has no known allergies. Review of Systems  General: No changes in weight, fatigue, or night sweats. HEENT: No blurry or decreased vision. No changes in hearing, nasal discharge or sore throat. Cardiovascular:  See HPI. Respiratory: No cough, hemoptysis, or wheezing. Gastrointestinal:  No abdominal pain, hematochezia, melana, constipation, diarrhea, or history of GI ulcers. Genito-Urinary: No dysuria or hematuria. No urgency or polyuria. Musculoskeletal:  No complaints of joint pain, joint swelling or muscular weakness/soreness. Neurological:  No dizziness, headaches, numbness/tingling, speech problems or weakness. Psychological:  No anxiety or depression. Hematological and Lymphatic: No abnormal bleeding or bruising, blood clots, jaundice or swollen lymph nodes. Endocrine:   No malaise/lethargy, palpitations, polydipsia/polyuria, temperature intolerance or unexpected weight changes  Skin:  No rashes or non-healing ulcers.     Physical Exam:  /70   Pulse 65   Ht 5' 10\" (1.778 m)   Wt 163 lb (73.9 kg)   BMI 23.39 kg/m²    General (appearance):  No acute distress  Eyes: anicteric   Neck: soft, No JVD  Ears/Nose/Mouth/Thorat: No cyanosis  CV: RRR   Respiratory:  Clear, normal effort  GI: soft, non-tender, non-distended  Skin: Warm, dry. No rashes  Neuro/Psych: Alert and oriented x 3. Appropriate behavior  Ext:  No c/c. No edema  Pulses:  2+carotid     Weight  Wt Readings from Last 3 Encounters:   12/01/21 163 lb (73.9 kg)   11/10/21 160 lb 6.4 oz (72.8 kg)   10/20/21 157 lb 6.4 oz (71.4 kg)          CBC:   Lab Results   Component Value Date    WBC 4.9 10/13/2021    HGB 14.4 10/13/2021    HCT 42.5 10/13/2021    MCV 89.9 10/13/2021     10/13/2021     BMP:  Lab Results   Component Value Date    CREATININE 0.9 10/20/2021    BUN 20 10/20/2021     10/20/2021    K 4.7 10/20/2021     10/20/2021    CO2 23 10/20/2021     Mag: No results found for: MG  LIVER PROFILE:   Lab Results   Component Value Date    ALT 33 10/13/2021    AST 20 10/13/2021    ALKPHOS 63 10/13/2021    BILITOT 0.9 10/13/2021     PT/INR:   Lab Results   Component Value Date    INR 0.97 10/13/2021    PROTIME 11.0 10/13/2021     Pro-BNP No results found for: PROBNP  LIPIDS:  No components found for: CHLPL  Lab Results   Component Value Date    TRIG 66 09/21/2021    TRIG 78 08/11/2020     Lab Results   Component Value Date    HDL 97 (H) 09/21/2021    HDL 66 (H) 08/11/2020     Lab Results   Component Value Date    LDLCALC 148 (H) 09/21/2021    LDLCALC 99 08/11/2020     Lab Results   Component Value Date    LABVLDL 13 09/21/2021    LABVLDL 16 08/11/2020     TSH:No results found for: TSH, W8YPXGF, N2OHNOZ, THYROIDAB    IMAGING:     10/13/2021 Coronary angiogram  Angiographic Findings:  Right dominant system  Left Main:  Normal  Left Anterior Descending:  Mid 20% stenosis. Circumflex:  Non-dominant. No obstructive disease  Right Coronary:  Very large artery.  Normal  Left Ventriculogram:  LVEF 20-25%  Hemodynamics (mm Hg):  Left Ventricular Pressure:  110/2, 4  Central Aortic Pressure:  117/67 (87)    10/5/2021 Echo:   Left ventricular cavity size is normal. There is mild concentric left   ventricular hypertrophy. Left ventricular function is reduced with ejection   fraction estimated at 20-25%. Global left ventricular hypokinesis is   present. Diastolic filling parameters suggest grade I diastolic dysfunction. Mitral annular calcification is present. Mild mitral regurgitation is   present. Mild aortic regurgitation is present. Mild tricuspid regurgitation. IVC size is dilated (>2.1 cm) but collapses > 50% with respiration   consistent with elevated RA pressure (8 mmHg). Estimated pulmonary artery   systolic pressure is at 31 mmHg assuming a right atrial pressure of 8 mmHg. Medications:   Current Outpatient Medications   Medication Sig Dispense Refill    metoprolol succinate (TOPROL XL) 50 MG extended release tablet Take 1 tablet by mouth daily 90 tablet 3    lisinopril (PRINIVIL;ZESTRIL) 5 MG tablet Take 1 tablet by mouth daily 90 tablet 3    aspirin EC 81 MG EC tablet Take 1 tablet by mouth daily 30 tablet 0    rosuvastatin (CRESTOR) 20 MG tablet       fluorometholone (FML) 0.1 % ophthalmic suspension       valACYclovir (VALTREX) 1 g tablet Take 1,000 mg by mouth daily       No current facility-administered medications for this visit. Assessment:  1. HFrEF (heart failure with reduced ejection fraction) ([address]    2. LBBB (left bundle branch block)    3.  Hypercholesterolemia        Plan:  LBBB; stable   No obstructive CAD on [facility]   ASA, crestor   ACE, BB  HFrEF EF 25%; stable   Appears compensated   Does not require diuretics    Lisinopril increased to 10 mg po daily    Check BNP and BMP in 1 week    Cont Toprol 50 mg po daily   HLD; uncontrolled    Crestor 20 mg    Will need to recheck CMP/Lipids    Follow up with me in 2 weeks  Increase toprol to 100 mg daily if HR > 70 if not then increase lisinopril to 20 mg if BP can tolerate.       Reviewed most recent: CBC, BMP, LFT, Lipids, PT/INR,  Reviewed most recent: ECG, Echo,  LHC

## 2021-12-01 NOTE — PATIENT INSTRUCTIONS
Increase lisinopril to 10 mg  Daily  Check labs in 1 week (no need to fast)  Follow up in 2 week  Plan to continues to adjust metoprolol or lisinopril next visit.

## 2021-12-08 DIAGNOSIS — I50.20 HFREF (HEART FAILURE WITH REDUCED EJECTION FRACTION) (HCC): ICD-10-CM

## 2021-12-08 LAB
ANION GAP SERPL CALCULATED.3IONS-SCNC: 15 MMOL/L (ref 3–16)
BUN BLDV-MCNC: 16 MG/DL (ref 7–20)
CALCIUM SERPL-MCNC: 9.3 MG/DL (ref 8.3–10.6)
CHLORIDE BLD-SCNC: 103 MMOL/L (ref 99–110)
CO2: 24 MMOL/L (ref 21–32)
CREAT SERPL-MCNC: 0.9 MG/DL (ref 0.8–1.3)
GFR AFRICAN AMERICAN: >60
GFR NON-AFRICAN AMERICAN: >60
GLUCOSE BLD-MCNC: 98 MG/DL (ref 70–99)
POTASSIUM SERPL-SCNC: 4.8 MMOL/L (ref 3.5–5.1)
PRO-BNP: 712 PG/ML (ref 0–124)
SODIUM BLD-SCNC: 142 MMOL/L (ref 136–145)

## 2021-12-21 ENCOUNTER — OFFICE VISIT (OUTPATIENT)
Dept: CARDIOLOGY CLINIC | Age: 71
End: 2021-12-21
Payer: MEDICARE

## 2021-12-21 VITALS
BODY MASS INDEX: 23.16 KG/M2 | HEART RATE: 72 BPM | WEIGHT: 161.4 LBS | SYSTOLIC BLOOD PRESSURE: 128 MMHG | DIASTOLIC BLOOD PRESSURE: 70 MMHG

## 2021-12-21 DIAGNOSIS — I44.7 LBBB (LEFT BUNDLE BRANCH BLOCK): ICD-10-CM

## 2021-12-21 DIAGNOSIS — I50.20 HFREF (HEART FAILURE WITH REDUCED EJECTION FRACTION) (HCC): Primary | ICD-10-CM

## 2021-12-21 DIAGNOSIS — E78.00 HYPERCHOLESTEROLEMIA: ICD-10-CM

## 2021-12-21 PROCEDURE — G8427 DOCREV CUR MEDS BY ELIG CLIN: HCPCS | Performed by: NURSE PRACTITIONER

## 2021-12-21 PROCEDURE — G8420 CALC BMI NORM PARAMETERS: HCPCS | Performed by: NURSE PRACTITIONER

## 2021-12-21 PROCEDURE — 3017F COLORECTAL CA SCREEN DOC REV: CPT | Performed by: NURSE PRACTITIONER

## 2021-12-21 PROCEDURE — 99214 OFFICE O/P EST MOD 30 MIN: CPT | Performed by: NURSE PRACTITIONER

## 2021-12-21 PROCEDURE — 1036F TOBACCO NON-USER: CPT | Performed by: NURSE PRACTITIONER

## 2021-12-21 PROCEDURE — 4040F PNEUMOC VAC/ADMIN/RCVD: CPT | Performed by: NURSE PRACTITIONER

## 2021-12-21 PROCEDURE — G8484 FLU IMMUNIZE NO ADMIN: HCPCS | Performed by: NURSE PRACTITIONER

## 2021-12-21 PROCEDURE — 1123F ACP DISCUSS/DSCN MKR DOCD: CPT | Performed by: NURSE PRACTITIONER

## 2021-12-21 RX ORDER — LISINOPRIL 10 MG/1
10 TABLET ORAL DAILY
Qty: 90 TABLET | Refills: 3 | Status: SHIPPED | OUTPATIENT
Start: 2021-12-21 | End: 2022-02-02

## 2021-12-21 RX ORDER — METOPROLOL SUCCINATE 100 MG/1
100 TABLET, EXTENDED RELEASE ORAL DAILY
Qty: 90 TABLET | Refills: 3 | Status: SHIPPED | OUTPATIENT
Start: 2021-12-21

## 2021-12-21 NOTE — PROGRESS NOTES
CC CHF, s/p LHC     HPI:  73 y.o. patient of Dr Felice Santizo with LBBB, LV dysfunction and HLD who had LHC which demonstrated no obstructive CAD and EF 25%. We are triturating CHF medications. He c/o aches, pains, fatigue and chills since getting Covid booster 2 weeks ago. He has back and knee pain. He has sinus congestion. Symptoms are slowly improving. Unable to walk miles a day like before because of fatigue. Denies cp, sob, LH/dizziness, palpitations or syncope. No LE edema, orthopnea, PND, abdominal bloating or weight gain. No fever,  n/v/d or GI/ bleeding. Past Medical History:   Diagnosis Date    LBBB (left bundle branch block) 10/2021    LV dysfunction 10/2021    EF 25%. No CAD seen on [facility]    Mixed hyperlipidemia     Tinnitus      Past Surgical History:   Procedure Laterality Date    APPENDECTOMY       Family History   Problem Relation Age of Onset    Cancer Mother     Cancer Father      Social History     Tobacco Use    Smoking status: Never Smoker    Smokeless tobacco: Never Used   Substance Use Topics    Alcohol use: Yes    Drug use: Not on file     Allergies:Patient has no known allergies. Review of Systems  General: No changes in weight, fatigue, or night sweats. HEENT: No blurry or decreased vision. No changes in hearing, nasal discharge or sore throat. Cardiovascular:  See HPI. Respiratory: No cough, hemoptysis, or wheezing. Gastrointestinal:  No abdominal pain, hematochezia, melana, constipation, diarrhea, or history of GI ulcers. Genito-Urinary: No dysuria or hematuria. No urgency or polyuria. Musculoskeletal:  No complaints of joint pain, joint swelling or muscular weakness/soreness. Neurological:  No dizziness, headaches, numbness/tingling, speech problems or weakness. Psychological:  No anxiety or depression. Hematological and Lymphatic: No abnormal bleeding or bruising, blood clots, jaundice or swollen lymph nodes.   Endocrine:   No malaise/lethargy, palpitations, polydipsia/polyuria, temperature intolerance or unexpected weight changes  Skin:  No rashes or non-healing ulcers. Physical Exam:  /70   Pulse 72   Wt 161 lb 6.4 oz (73.2 kg)   BMI 23.16 kg/m²    General (appearance):  No acute distress  Eyes: anicteric   Neck: soft, No JVD  Ears/Nose/Mouth/Thorat: No cyanosis  CV: RRR   Respiratory:  Clear, normal effort  GI: soft, non-tender, non-distended  Skin: Warm, dry. No rashes  Neuro/Psych: Alert and oriented x 3. Appropriate behavior  Ext:  No c/c.  No edema  Pulses:  2+carotid     Weight  Wt Readings from Last 3 Encounters:   12/01/21 163 lb (73.9 kg)   11/10/21 160 lb 6.4 oz (72.8 kg)   10/20/21 157 lb 6.4 oz (71.4 kg)          CBC:   Lab Results   Component Value Date    WBC 4.9 10/13/2021    HGB 14.4 10/13/2021    HCT 42.5 10/13/2021    MCV 89.9 10/13/2021     10/13/2021     BMP:  Lab Results   Component Value Date    CREATININE 0.9 12/08/2021    BUN 16 12/08/2021     12/08/2021    K 4.8 12/08/2021     12/08/2021    CO2 24 12/08/2021     Mag: No results found for: MG  LIVER PROFILE:   Lab Results   Component Value Date    ALT 33 10/13/2021    AST 20 10/13/2021    ALKPHOS 63 10/13/2021    BILITOT 0.9 10/13/2021     PT/INR:   Lab Results   Component Value Date    INR 0.97 10/13/2021    PROTIME 11.0 10/13/2021     Pro-BNP   Lab Results   Component Value Date    PROBNP 712 12/08/2021     LIPIDS:  No components found for: CHLPL  Lab Results   Component Value Date    TRIG 66 09/21/2021    TRIG 78 08/11/2020     Lab Results   Component Value Date    HDL 97 (H) 09/21/2021    HDL 66 (H) 08/11/2020     Lab Results   Component Value Date    LDLCALC 148 (H) 09/21/2021    LDLCALC 99 08/11/2020     Lab Results   Component Value Date    LABVLDL 13 09/21/2021    LABVLDL 16 08/11/2020     TSH:No results found for: TSH, [id number], C8AJALY, THYROIDAB    IMAGING:     10/13/2021 Coronary angiogram  Angiographic Findings:  Right dominant system  Left Main: Normal  Left Anterior Descending:  Mid 20% stenosis. Circumflex:  Non-dominant. No obstructive disease  Right Coronary:  Very large artery. Normal  Left Ventriculogram:  LVEF 20-25%  Hemodynamics (mm Hg):  Left Ventricular Pressure:  110/2, 4  Central Aortic Pressure:  117/67 (87)    10/5/2021 Echo:   Left ventricular cavity size is normal. There is mild concentric left   ventricular hypertrophy. Left ventricular function is reduced with ejection   fraction estimated at 20-25%. Global left ventricular hypokinesis is   present. Diastolic filling parameters suggest grade I diastolic dysfunction. Mitral annular calcification is present. Mild mitral regurgitation is   present. Mild aortic regurgitation is present. Mild tricuspid regurgitation. IVC size is dilated (>2.1 cm) but collapses > 50% with respiration   consistent with elevated RA pressure (8 mmHg). Estimated pulmonary artery   systolic pressure is at 31 mmHg assuming a right atrial pressure of 8 mmHg. Medications:   Current Outpatient Medications   Medication Sig Dispense Refill    lisinopril (PRINIVIL;ZESTRIL) 10 MG tablet Take 1 tablet by mouth daily 90 tablet 3    metoprolol succinate (TOPROL XL) 50 MG extended release tablet Take 1 tablet by mouth daily 90 tablet 3    aspirin EC 81 MG EC tablet Take 1 tablet by mouth daily 30 tablet 0    rosuvastatin (CRESTOR) 20 MG tablet       fluorometholone (FML) 0.1 % ophthalmic suspension       valACYclovir (VALTREX) 1 g tablet Take 1,000 mg by mouth daily       No current facility-administered medications for this visit. Assessment:  1. HFrEF (heart failure with reduced ejection fraction) ([address]    2. LBBB (left bundle branch block)    3.  Hypercholesterolemia        Plan:  LBBB; stable   No obstructive CAD on [facility]   ASA, crestor   ACE, BB  HFrEF EF 25%; stable   Appears compensated   Does not require diuretics    Lisinopril 10 mg po daily    Toprol increased to 100 mg po daily   HLD; stable Crestor 20 mg    Will need to recheck CMP/Lipids    Follow up with me in 2 weeks  Increase lisinopril to 20 mg po daily next visit and check BMP    Reviewed most recent: CBC, BMP, LFT, Lipids, PT/INR,  Reviewed most recent: ECG, Echo,  LHC

## 2022-02-02 ENCOUNTER — OFFICE VISIT (OUTPATIENT)
Dept: CARDIOLOGY CLINIC | Age: 72
End: 2022-02-02
Payer: MEDICARE

## 2022-02-02 VITALS
HEART RATE: 72 BPM | BODY MASS INDEX: 23.31 KG/M2 | SYSTOLIC BLOOD PRESSURE: 132 MMHG | OXYGEN SATURATION: 96 % | HEIGHT: 70 IN | DIASTOLIC BLOOD PRESSURE: 78 MMHG | WEIGHT: 162.8 LBS

## 2022-02-02 DIAGNOSIS — I44.7 LBBB (LEFT BUNDLE BRANCH BLOCK): ICD-10-CM

## 2022-02-02 DIAGNOSIS — C85.91 LYMPHOMA OF LYMPH NODES OF HEAD, UNSPECIFIED LYMPHOMA TYPE (HCC): ICD-10-CM

## 2022-02-02 DIAGNOSIS — I51.9 LV DYSFUNCTION: Primary | ICD-10-CM

## 2022-02-02 DIAGNOSIS — I10 BENIGN ESSENTIAL HTN: ICD-10-CM

## 2022-02-02 DIAGNOSIS — E78.00 HYPERCHOLESTEROLEMIA: ICD-10-CM

## 2022-02-02 PROCEDURE — G8484 FLU IMMUNIZE NO ADMIN: HCPCS | Performed by: INTERNAL MEDICINE

## 2022-02-02 PROCEDURE — 4040F PNEUMOC VAC/ADMIN/RCVD: CPT | Performed by: INTERNAL MEDICINE

## 2022-02-02 PROCEDURE — 1123F ACP DISCUSS/DSCN MKR DOCD: CPT | Performed by: INTERNAL MEDICINE

## 2022-02-02 PROCEDURE — G8420 CALC BMI NORM PARAMETERS: HCPCS | Performed by: INTERNAL MEDICINE

## 2022-02-02 PROCEDURE — 3017F COLORECTAL CA SCREEN DOC REV: CPT | Performed by: INTERNAL MEDICINE

## 2022-02-02 PROCEDURE — G8427 DOCREV CUR MEDS BY ELIG CLIN: HCPCS | Performed by: INTERNAL MEDICINE

## 2022-02-02 PROCEDURE — 1036F TOBACCO NON-USER: CPT | Performed by: INTERNAL MEDICINE

## 2022-02-02 PROCEDURE — 99214 OFFICE O/P EST MOD 30 MIN: CPT | Performed by: INTERNAL MEDICINE

## 2022-02-02 RX ORDER — LISINOPRIL 20 MG/1
20 TABLET ORAL DAILY
Qty: 90 TABLET | Refills: 3 | Status: SHIPPED | OUTPATIENT
Start: 2022-02-02

## 2022-02-02 NOTE — PATIENT INSTRUCTIONS
-Increase lisinopril to 20 mg daily  -Get bloodwork in 7-10 days  -See Dr. Gregory Daly in 4-6 months

## 2022-02-02 NOTE — PROGRESS NOTES
73 y.o. here for LBBB. Has NICM with cath in 10/2021 with no cad; ef ~25%. 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 No cp. No sob. No n/v/LH/dizziness. No syncope. Got booster shot and had symptoms/reaction for weeks. Feeling ok now. Not doing a lot outside but uses ellyptical and stretches in basement. Tolerating meds. Past Medical History:   Diagnosis Date    LBBB (left bundle branch block) 10/2021    LV dysfunction 10/2021    EF 25%. No CAD [facility] on [facility]    Mixed hyperlipidemia     Tinnitus    LBBB    Past Surgical History:   Procedure Laterality Date    APPENDECTOMY       Social History     Tobacco Use    Smoking status: Never Smoker    Smokeless tobacco: Never Used   Substance Use Topics    Alcohol use: Yes    Drug use: Not on file     No Known Allergies    Family History   Problem Relation Age of Onset    Cancer Mother     Cancer Father      Review of Systems   General: No fevers, chills, fatigue, or night sweats. No abnormal changes in weight. HEENT: No blurry or decreased vision. No changes in hearing, nasal discharge or sore throat. Cardiovascular: See HPI. No cramping in legs or buttocks when walking. Respiratory: No cough, hemoptysis, or wheezing. No history of asthma. Gastrointestinal: No abdominal pain, hematochezia, melana, or history of GI ulcers. Genito-Urinary: No dysuria or hematuria. No urgency or polyuria. Musculoskeletal: No complaints of joint pain, joint swelling or muscular weakness/soreness. Neurological: No dizziness or headaches. No numbness/tingling, speech problems or weakness. No history of a stroke or TIA. Psychological: No anxiety or depression  Hematological and Lymphatic: No abnormal bleeding or bruising, blood clots, jaundice. Endocrine: No malaise/lethargy, palpitations, polydipsia/polyuria, temperature intolerance or unexpected weight changes. Skin: No rashes or non-healing ulcers.     PE:  Blood pressure 132/78, pulse 72, height 5' 10\" (1.778 m), weight 162 lb 12.8 oz (73.8 kg), SpO2 96 %. General (appearance): Well devel. 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 No JVD  Ears/Nose/Mouth/Thorat: No cyanosis  CV: RRR. No m/r/g   Respiratory:  Clear b, normal respiratory effort  GI: abd s/nt/nd  Skin: Warm, dry. No rashes  Neuro/Psych: Alert and oriented x 3. Appropriate behavior  Ext:  No c/c. No edema  Pulses:  2+ radial    Lab Results   Component Value Date    WBC 4.9 10/13/2021    HGB 14.4 10/13/2021    HCT 42.5 10/13/2021    MCV 89.9 10/13/2021     10/13/2021     Lab Results   Component Value Date     12/08/2021    K 4.8 12/08/2021     12/08/2021    CO2 24 12/08/2021    BUN 16 12/08/2021    CREATININE 0.9 12/08/2021    GLUCOSE 98 12/08/2021    CALCIUM 9.3 12/08/2021    PROT 7.4 10/13/2021    LABALBU 4.6 10/13/2021    BILITOT 0.9 10/13/2021    ALKPHOS 63 10/13/2021    AST 20 10/13/2021    ALT 33 10/13/2021    LABGLOM >60 12/08/2021    GFRAA >60 12/08/2021    AGRATIO 1.6 10/13/2021    GLOB 2.8 10/13/2021     Lab Results   Component Value Date    CHOL 258 (H) 09/21/2021    CHOL 181 08/11/2020     Lab Results   Component Value Date    TRIG 66 09/21/2021    TRIG 78 08/11/2020     Lab Results   Component Value Date    HDL 97 (H) 09/21/2021    HDL 66 (H) 08/11/2020     Lab Results   Component Value Date    LDLCALC 148 (H) 09/21/2021    LDLCALC 99 08/11/2020     Lab Results   Component Value Date    LABVLDL 13 09/21/2021    LABVLDL 16 08/11/2020     No results found for: CHOLHDLRATIO    10/2021 LHC:  Angiographic Findings:  Right dominant system  Left Main:  Normal  Left Anterior Descending:  Mid 20% stenosis. Circumflex:  Non-dominant. No obstructive disease  Right Coronary:  Very large artery.  Normal  Left Ventriculogram:  LVEF 20-25%  Hemodynamics (mm Hg):  Left Ventricular Pressure:  110/2, 4  Central Aortic Pressure:  117/67 (87)    10/2021 ECG: NSR, LBBB    The 10-year ASCVD risk score (Nilsa Bloom et al., 2013) is: 15%    Values used to calculate the score:      Age: 73 years      Sex: Male      Is Non- : No      Diabetic: No      Tobacco smoker: No      Systolic Blood Pressure: 495 mmHg      Is BP treated: No      HDL Cholesterol: 97 mg/dL      Total Cholesterol: 258 mg/dL      Current Outpatient Medications:     metoprolol succinate (TOPROL XL) 100 MG extended release tablet, Take 1 tablet by mouth daily, Disp: 90 tablet, Rfl: 3    lisinopril (PRINIVIL;ZESTRIL) 10 MG tablet, Take 1 tablet by mouth daily, Disp: 90 tablet, Rfl: 3    aspirin EC 81 MG EC tablet, Take 1 tablet by mouth daily, Disp: 30 tablet, Rfl: 0    rosuvastatin (CRESTOR) 20 MG tablet, , Disp: , Rfl:     fluorometholone (FML) 0.1 % ophthalmic suspension, , Disp: , Rfl:     valACYclovir (VALTREX) 1 g tablet, Take 1,000 mg by mouth daily, Disp: , Rfl:     A/P:  73 y.o. here for LBBB, LV dysfunction  Issues:  1. LV dysfunction    2. LBBB (left bundle branch block)    3. Hypercholesterolemia    4. Lymphoma of lymph nodes of head, unspecified lymphoma type ([facility] [address]    5. Benign essential HTN        Recs:  -Cont crestor  -Toprol 100  -Lisinopril to 20 and get BMP in 1-2 weeks    Maricruz Solorzano MD, [facility]

## 2022-02-14 LAB
ANION GAP SERPL CALCULATED.3IONS-SCNC: 12 MMOL/L (ref 3–16)
BUN BLDV-MCNC: 18 MG/DL (ref 7–20)
CALCIUM SERPL-MCNC: 8.9 MG/DL (ref 8.3–10.6)
CHLORIDE BLD-SCNC: 105 MMOL/L (ref 99–110)
CO2: 25 MMOL/L (ref 21–32)
CREAT SERPL-MCNC: 0.8 MG/DL (ref 0.8–1.3)
GFR AFRICAN AMERICAN: >60
GFR NON-AFRICAN AMERICAN: >60
GLUCOSE BLD-MCNC: 91 MG/DL (ref 70–99)
POTASSIUM SERPL-SCNC: 4.4 MMOL/L (ref 3.5–5.1)
SODIUM BLD-SCNC: 142 MMOL/L (ref 136–145)

## 2022-08-23 NOTE — TELEPHONE ENCOUNTER
Pt states that Dr. Amie Tovar prescribed Coplidogrel and was not sure if he should continue taking it. I informed pt that it was discontinued by Dr. Amie Tovar and in his instruction it states please stop taking at discharge. He can be reached at  [phone] for any further question.
If you are a smoker, it is important for your health to stop smoking. Please be aware that second hand smoke is also harmful.

## 2023-05-08 ENCOUNTER — TELEPHONE (OUTPATIENT)
Dept: DERMATOLOGY | Age: 73
End: 2023-05-08

## 2023-05-08 NOTE — TELEPHONE ENCOUNTER
Pt calling wanting  to see if  will see him as a NP she sees his wife I informed him she was not accepting NP pls return call back @ [phone] to discuss

## 2023-05-09 NOTE — TELEPHONE ENCOUNTER
Patient's wife notified not accepting new patients at this time and sending Mychart message with dermatology practices that may be accepting new patients.

## 2025-07-10 ENCOUNTER — TELEPHONE (OUTPATIENT)
Age: 75
End: 2025-07-10

## 2025-07-10 NOTE — TELEPHONE ENCOUNTER
Pt called he would be a new pt. His wife(Evette) is a pt of yours. He would like a skin exam.    [phone]

## 2025-07-16 ENCOUNTER — TELEPHONE (OUTPATIENT)
Age: 75
End: 2025-07-16

## 2025-07-16 NOTE — TELEPHONE ENCOUNTER
His wife(Zenobia Mathew ) is a pt of yours. He would like to be seen for a skin exam as new pt.    [phone]

## 2025-08-20 ENCOUNTER — OFFICE VISIT (OUTPATIENT)
Dept: DERMATOLOGY | Age: 75
End: 2025-08-20
Payer: MEDICARE

## 2025-08-20 DIAGNOSIS — L57.0 ACTINIC KERATOSES: ICD-10-CM

## 2025-08-20 DIAGNOSIS — C44.90 NON-MELANOMA SKIN CANCER: Primary | ICD-10-CM

## 2025-08-20 DIAGNOSIS — D18.01 CHERRY ANGIOMA: ICD-10-CM

## 2025-08-20 DIAGNOSIS — L21.9 SEBORRHEIC DERMATITIS: ICD-10-CM

## 2025-08-20 DIAGNOSIS — L82.1 SEBORRHEIC KERATOSES: ICD-10-CM

## 2025-08-20 DIAGNOSIS — D22.9 MULTIPLE BENIGN NEVI: ICD-10-CM

## 2025-08-20 PROCEDURE — 17000 DESTRUCT PREMALG LESION: CPT | Performed by: STUDENT IN AN ORGANIZED HEALTH CARE EDUCATION/TRAINING PROGRAM

## 2025-08-20 PROCEDURE — 1123F ACP DISCUSS/DSCN MKR DOCD: CPT | Performed by: STUDENT IN AN ORGANIZED HEALTH CARE EDUCATION/TRAINING PROGRAM

## 2025-08-20 PROCEDURE — 1036F TOBACCO NON-USER: CPT | Performed by: STUDENT IN AN ORGANIZED HEALTH CARE EDUCATION/TRAINING PROGRAM

## 2025-08-20 PROCEDURE — 3017F COLORECTAL CA SCREEN DOC REV: CPT | Performed by: STUDENT IN AN ORGANIZED HEALTH CARE EDUCATION/TRAINING PROGRAM

## 2025-08-20 PROCEDURE — 17003 DESTRUCT PREMALG LES 2-14: CPT | Performed by: STUDENT IN AN ORGANIZED HEALTH CARE EDUCATION/TRAINING PROGRAM

## 2025-08-20 PROCEDURE — G8427 DOCREV CUR MEDS BY ELIG CLIN: HCPCS | Performed by: STUDENT IN AN ORGANIZED HEALTH CARE EDUCATION/TRAINING PROGRAM

## 2025-08-20 PROCEDURE — 99203 OFFICE O/P NEW LOW 30 MIN: CPT | Performed by: STUDENT IN AN ORGANIZED HEALTH CARE EDUCATION/TRAINING PROGRAM

## 2025-08-20 PROCEDURE — 1159F MED LIST DOCD IN RCRD: CPT | Performed by: STUDENT IN AN ORGANIZED HEALTH CARE EDUCATION/TRAINING PROGRAM

## 2025-08-20 PROCEDURE — G8421 BMI NOT CALCULATED: HCPCS | Performed by: STUDENT IN AN ORGANIZED HEALTH CARE EDUCATION/TRAINING PROGRAM

## 2025-08-20 RX ORDER — SILDENAFIL 100 MG/1
100 TABLET, FILM COATED ORAL PRN
COMMUNITY